# Patient Record
Sex: FEMALE | Race: WHITE | NOT HISPANIC OR LATINO
[De-identification: names, ages, dates, MRNs, and addresses within clinical notes are randomized per-mention and may not be internally consistent; named-entity substitution may affect disease eponyms.]

---

## 2019-06-17 ENCOUNTER — RESULT REVIEW (OUTPATIENT)
Age: 54
End: 2019-06-17

## 2021-04-16 PROBLEM — Z00.00 ENCOUNTER FOR PREVENTIVE HEALTH EXAMINATION: Status: ACTIVE | Noted: 2021-04-16

## 2021-05-18 ENCOUNTER — APPOINTMENT (OUTPATIENT)
Dept: OBGYN | Facility: CLINIC | Age: 56
End: 2021-05-18
Payer: COMMERCIAL

## 2021-05-18 VITALS
SYSTOLIC BLOOD PRESSURE: 112 MMHG | TEMPERATURE: 97.4 F | RESPIRATION RATE: 16 BRPM | HEIGHT: 66 IN | DIASTOLIC BLOOD PRESSURE: 68 MMHG | BODY MASS INDEX: 37.28 KG/M2 | WEIGHT: 232 LBS

## 2021-05-18 DIAGNOSIS — Z78.9 OTHER SPECIFIED HEALTH STATUS: ICD-10-CM

## 2021-05-18 DIAGNOSIS — Z83.3 FAMILY HISTORY OF DIABETES MELLITUS: ICD-10-CM

## 2021-05-18 DIAGNOSIS — Z82.49 FAMILY HISTORY OF ISCHEMIC HEART DISEASE AND OTHER DISEASES OF THE CIRCULATORY SYSTEM: ICD-10-CM

## 2021-05-18 DIAGNOSIS — Z86.79 PERSONAL HISTORY OF OTHER DISEASES OF THE CIRCULATORY SYSTEM: ICD-10-CM

## 2021-05-18 PROCEDURE — 99072 ADDL SUPL MATRL&STAF TM PHE: CPT

## 2021-05-18 PROCEDURE — 99386 PREV VISIT NEW AGE 40-64: CPT

## 2021-05-18 RX ORDER — MV-MIN/FOLIC/VIT K/LYCOP/COQ10 200-100MCG
CAPSULE ORAL
Refills: 0 | Status: ACTIVE | COMMUNITY

## 2021-05-18 RX ORDER — ROSUVASTATIN CALCIUM 20 MG/1
20 TABLET, FILM COATED ORAL
Refills: 0 | Status: ACTIVE | COMMUNITY

## 2021-05-18 NOTE — LETTER GREETING
[Dear  ___] : Dear  [unfilled], [FreeTextEntry1] : I had the pleasure of evaluating your patient, KAYLEY WHITNEY . Please see my summary of recommendations followed by my full note. \par \par Thank you for allowing me to participate in the care of this patient. If you have any questions, please do not hesitate to contact me.\par \par Sincerely,\par \par Emily Mccullough MD, FACOG \par [facility]\par Obstetrics and Gynecology in [place]\[address], [facility] [address]\[place] [address]\[facility] Phone: [phone] Fax: [phone]

## 2021-05-18 NOTE — DISCUSSION/SUMMARY
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 \par 1) Health maintenance: \par Vaginal pap today given unclear hx\par Up to date on mammogram and colonoscopy\par \par 2) Hx of atypical cells:\par Records requested for prior paps and op report/pathology report\par If no evidence of cervical dysplasia, can discontinue screening pap smears\par \par Return in 1 year or prn

## 2021-05-18 NOTE — PHYSICAL EXAM
[Appropriately responsive] : appropriately responsive [Alert] : alert [No Acute Distress] : no acute distress [No Lymphadenopathy] : no lymphadenopathy [Soft] : soft [Non-tender] : non-tender [Non-distended] : non-distended [No HSM] : No HSM [No Lesions] : no lesions [No Mass] : no mass [Oriented x3] : oriented x3 [Examination Of The Breasts] : a normal appearance [No Discharge] : no discharge [No Masses] : no breast masses were palpable [Labia Majora] : normal [Labia Minora] : normal [Normal] : normal [Absent] : absent [Uterine Adnexae] : normal [FreeTextEntry4] : vaginal cuff intact, no lesions [FreeTextEntry8] : vaginal cuff intact

## 2021-05-18 NOTE — HISTORY OF PRESENT ILLNESS
[Patient reported mammogram was normal] : Patient reported mammogram was normal [Patient reported breast sonogram was normal] : Patient reported breast sonogram was normal [Patient reported PAP Smear was normal] : Patient reported PAP Smear was normal [Patient reported bone density results were normal] : Patient reported bone density results were normal [Patient reported colonoscopy was normal] : Patient reported colonoscopy was normal [postmenopausal] : postmenopausal [N] : Patient is not sexually active [Y] : Positive pregnancy history [Mammogramdate] : 2021 [BreastSonogramDate] : 2021 [PapSmeardate] : 2019 [BoneDensityDate] : 2018 [TextBox_43] : repeat 10 yrs [ColonoscopyDate] : 2013 [LMPDate] : 2017 [PGHxTotal] : 2 [[facility] : 2 [[facility]] : 2 [FreeTextEntry1] : NSVDx1, C/S x 1. Denies cysts, fibroids, abnormal pap, STI.

## 2021-05-20 LAB — HPV HIGH+LOW RISK DNA PNL CVX: NOT DETECTED

## 2021-05-24 LAB — CYTOLOGY CVX/VAG DOC THIN PREP: NORMAL

## 2021-06-07 ENCOUNTER — NON-APPOINTMENT (OUTPATIENT)
Age: 56
End: 2021-06-07

## 2021-06-24 ENCOUNTER — APPOINTMENT (OUTPATIENT)
Dept: OBGYN | Facility: CLINIC | Age: 56
End: 2021-06-24
Payer: COMMERCIAL

## 2021-06-24 VITALS
HEIGHT: 66 IN | WEIGHT: 231 LBS | BODY MASS INDEX: 37.12 KG/M2 | TEMPERATURE: 97.2 F | DIASTOLIC BLOOD PRESSURE: 66 MMHG | RESPIRATION RATE: 16 BRPM | SYSTOLIC BLOOD PRESSURE: 110 MMHG

## 2021-06-24 LAB
BILIRUB UR QL STRIP: NORMAL
CLARITY UR: CLEAR
COLLECTION METHOD: NORMAL
GLUCOSE UR-MCNC: NORMAL
HCG UR QL: 1 EU/DL
HGB UR QL STRIP.AUTO: NORMAL
KETONES UR-MCNC: NORMAL
LEUKOCYTE ESTERASE UR QL STRIP: NORMAL
NITRITE UR QL STRIP: NORMAL
PH UR STRIP: 7
PROT UR STRIP-MCNC: NORMAL
SP GR UR STRIP: 1.01

## 2021-06-24 PROCEDURE — 99213 OFFICE O/P EST LOW 20 MIN: CPT

## 2021-06-24 PROCEDURE — 81003 URINALYSIS AUTO W/O SCOPE: CPT | Mod: QW

## 2021-06-24 PROCEDURE — 99072 ADDL SUPL MATRL&STAF TM PHE: CPT

## 2021-06-24 NOTE — DISCUSSION/SUMMARY
[FreeTextEntry1] : 55 yo with pelvic pressure now nearly resolved, findings of cystocele. Symptoms likely secondary to constipation.\par -Discussed importance of PO hydration, treating constipation as this can cause pelvic pressure, additional strain on cystocele, and dysfunctional voiding\par -Management of cystocele: pt is largely asymptomatic, we discussed kegel exercises, pessary, surgical correction. She would like to try kegel exercises for now. Handout provided\par -Return in 3 months for follow-up

## 2021-06-24 NOTE — HISTORY OF PRESENT ILLNESS
[FreeTextEntry1] : Pt here for evaluation of pelvic pressure. States has changed her diet recently and has had significant constipation for last 2 weeks. 3 days ago, states she felt pelvic pressure in suprapubic region which worsened over 2 days. Denies dysuria, hematuria, frequency or hesitancy. She did experience 2 episodes of urinary incontinence. Denies vaginal discharge. Pelvic pressure has since improved and nearly resolved. Saw PCP, UA and UCx was negative. \par \par Udip in office negative today.

## 2021-06-24 NOTE — PHYSICAL EXAM
[Appropriately responsive] : appropriately responsive [Alert] : alert [No Acute Distress] : no acute distress [Soft] : soft [Non-tender] : non-tender [Non-distended] : non-distended [No HSM] : No HSM [No Lesions] : no lesions [No Mass] : no mass [Oriented x3] : oriented x3 [FreeTextEntry7] : No CVAT [Labia Majora] : normal [Labia Minora] : normal [Normal] : normal [Absent] : absent [Uterine Adnexae] : normal [FreeTextEntry4] : vaginal cuff intact, no lesions, cystocele grade 2 on valsalva, no rectocele [FreeTextEntry8] : vaginal cuff intact

## 2021-06-26 ENCOUNTER — TRANSCRIPTION ENCOUNTER (OUTPATIENT)
Age: 56
End: 2021-06-26

## 2021-09-27 ENCOUNTER — APPOINTMENT (OUTPATIENT)
Dept: OBGYN | Facility: CLINIC | Age: 56
End: 2021-09-27
Payer: COMMERCIAL

## 2021-09-27 VITALS
HEIGHT: 66 IN | DIASTOLIC BLOOD PRESSURE: 68 MMHG | TEMPERATURE: 96.5 F | RESPIRATION RATE: 16 BRPM | SYSTOLIC BLOOD PRESSURE: 110 MMHG | WEIGHT: 229 LBS | BODY MASS INDEX: 36.8 KG/M2

## 2021-09-27 PROCEDURE — 99213 OFFICE O/P EST LOW 20 MIN: CPT

## 2021-09-27 NOTE — DISCUSSION/SUMMARY
[FreeTextEntry1] : 57 yo here for follow-up of pelvic pressure/cystocele. Pelvic pressure was likely secondary to constipation which has now resolved.\par -Continue kegel exercises\par -Return May 2022 for well woman exam, sooner if symptoms arise

## 2021-09-27 NOTE — HISTORY OF PRESENT ILLNESS
[FreeTextEntry1] : Pt here for follow-up. Had back injury beginning of September, just had MRI last week, finding out results today. Pt states since treating constipation she has had no further issues with pelvic pressures. Denies bulge symptoms, denies incontinence.

## 2022-04-13 PROBLEM — Z00.00 ENCOUNTER FOR PREVENTIVE HEALTH EXAMINATION: Noted: 2022-04-13

## 2022-04-18 ENCOUNTER — APPOINTMENT (OUTPATIENT)
Dept: PAIN MANAGEMENT | Facility: CLINIC | Age: 57
End: 2022-04-18
Payer: COMMERCIAL

## 2022-04-18 VITALS — BODY MASS INDEX: 36.96 KG/M2 | HEIGHT: 66 IN | WEIGHT: 230 LBS

## 2022-04-18 DIAGNOSIS — M47.816 SPONDYLOSIS W/OUT MYELOPATHY OR RADICULOPATHY, LUMBAR REGION: ICD-10-CM

## 2022-04-18 PROCEDURE — 99203 OFFICE O/P NEW LOW 30 MIN: CPT

## 2022-04-18 NOTE — ASSESSMENT
[FreeTextEntry1] : A discussion regarding available pain management treatment options occurred with the patient.  These included interventional, rehabilitative, pharmacological, and alternative modalities. We will proceed with the following: \par \par Interventional treatment options:\par - Will consider further pending of MRI imaging\par - May consider lumbar facet directed intervention for ongoing axial low back pain  \par - see additional instructions below \par \par Rehabilitative options: \par - patient failed prior PT trials  \par - participation in active HEP was discussed \par \par Medication based treatment options: \par - initiate trial of MDP \par - poor candidate for oral NSAIDs secondary to prior gastric bypass \par - encouraged usage of OTC analgesics \par - see additional instructions below \par \par Complementary treatment options: \par - Weight management and lifestyle modifications discussed \par - See additional instructions below \par \par Additional treatment recommendations as follows: \par - obtain MRI cervical spine \par - advised f/u with rheumatology \par - follow up after imaging studies for further recommendations\par \par The documentation recorded by the scribe, in my presence, accurately reflects the service I personally performed and the decisions made by me with my edits as appropriate.\par \par I, Alysia Rosenbaum acting as scribe, attest that this documentation has been prepared under the direction and in the presence of Provider Soren Cross DO.

## 2022-04-18 NOTE — HISTORY OF PRESENT ILLNESS
[FreeTextEntry1] : The patient presents for initial evaluation regarding their lumbar and cervical pain. Patient was referred by Dr. Sanchez.  Patient c/o pain to the right buttock pain as well as neck radiating to the right upper extremity. She reports difficulty with sleep. Patient reports she had a near slip and fall in September 2021 which caused onset of symptoms. She sought assistance immediately at [place]. Patient reports progression of pain x 2 weeks. Patient denies weakness, B/B dysfunction or paraesthesia to the bilateral lower extremities. Patient reports no observable benefit with PT. Patient reports she has RA, not controlled with rx. \par  \par Subjective weakness: No \par Lower extremity paresthesias: No \par Bladder/bowel dysfunction: No \par \par Medications: No \par  \par Injections: No \par  \par Pertinent Surgical History: N/A \par  \par Imaging: \par MRI Lumbar Spine (09/23/21) -  Stand Up MRI\par \par At the L1-2 and L2-3 levels, there is facet arthropathy.\par At the L3-4 level, there is a circumferential disc bulge which, posteriorly, impresses upon the thecal sac, extends into and narrows the anteroinferior aspects of both L3-4 neural foramen. There is bilateral facet arthropathy.\par At the L4-5 level, there is a circumferential disc bulge which, posteriorly, impresses upon the thecal sac. There is bilateral facet arthropathy.\par At the L5-S1 level, there is a posterior disc herniation approaching the ventral cord surface. There is anterior disc bulging. There is bilateral facet arthropathy.\par \par Physician Disclaimer: I have personally reviewed and confirmed all HPI data with the patient.

## 2022-04-18 NOTE — PHYSICAL EXAM
[Normal Coordination] : normal coordination [Normal Mood and Affect] : normal mood and affect [Orientated] : orientated [Able to Communicate] : able to communicate [Well Developed] : well developed [Well Nourished] : well nourished [Rotation to right] : rotation to right [5___] : right finger abductors 5[unfilled]/5 [4___] : right grasp 4[unfilled]/5 [Extension] : extension [] : sensory exam non-focal throughout both lower extremities

## 2022-05-19 ENCOUNTER — APPOINTMENT (OUTPATIENT)
Dept: OBGYN | Facility: CLINIC | Age: 57
End: 2022-05-19

## 2022-05-23 ENCOUNTER — APPOINTMENT (OUTPATIENT)
Dept: PAIN MANAGEMENT | Facility: CLINIC | Age: 57
End: 2022-05-23
Payer: COMMERCIAL

## 2022-05-23 VITALS — HEIGHT: 66 IN | WEIGHT: 230 LBS | BODY MASS INDEX: 36.96 KG/M2

## 2022-05-23 DIAGNOSIS — M79.18 MYALGIA, OTHER SITE: ICD-10-CM

## 2022-05-23 PROCEDURE — 99214 OFFICE O/P EST MOD 30 MIN: CPT

## 2022-05-24 NOTE — HISTORY OF PRESENT ILLNESS
[Neck] : neck [Sudden] : sudden [2] : 2 [Dull/Aching] : dull/aching [Localized] : localized [Intermittent] : intermittent [Leisure] : leisure [Social interactions] : social interactions [Meds] : meds [Full time] : Work status: full time [FreeTextEntry1] : 5/23/22 - Patient presents for an MRI imaging review. Patient reports improvement of symptoms since the previous visit. She reports benefit with MDP. Patient reports residual pain to the neck radiating to the top of the right shoulder. She reports resolution of RUE pain. \par \par 4/18/22 - The patient presents for initial evaluation regarding their lumbar and cervical pain. Patient was referred by Dr. Sanchez.  Patient c/o pain to the right buttock pain as well as neck radiating to the right upper extremity. She reports difficulty with sleep. Patient reports she had a near slip and fall in September 2021 which caused onset of symptoms. She sought assistance immediately at [place]. Patient reports progression of pain x 2 weeks. Patient denies weakness, B/B dysfunction or paraesthesia to the bilateral lower extremities. Patient reports no observable benefit with PT. Patient reports she has RA, not controlled with rx.\par  \par Injections: No\par  \par Pertinent Surgical History: N/A \par  \par Imaging: \par 1) MRI Cervical Spine (04/22/22) - [facility]\par \par C2-C3: No disc protrusion. There is no neural foraminal narrowing. There is no central canal stenosis. There is no facet joint arthrosis.\par C3-C4: No disc protrusion. There is bilateral neural foraminal narrowing. There is no central canal stenosis. There is no facet joint arthrosis.\par C4-C5: There is a minimal broad-based posterior disc osteophyte complex. There is bilateral neural foraminal narrowing. There is no central canal stenosis. There is no facet joint arthrosis.\par C5-C6: There is a 3 mm broad-based posterior disc osteophyte complex. There is bilateral neural foraminal narrowing. There is spinal canal stenosis measuring less than 9 mm in AP dimension. There is bilateral facet joint arthrosis.\par C6-C7: There is a 3 to 4 mm broad-based posterior disc osteophyte complex. There is bilateral neural foraminal narrowing. There is spinal canal stenosis measuring 9 mm in AP dimension. There is no facet joint arthrosis.\par C7-T1: No disc protrusion. There is no neural foraminal narrowing. There is no central canal stenosis. There is no facet joint arthrosis.\par \par 2) MRI Lumbar Spine (09/23/21) -  Stand Up MRI\par \par At the L1-2 and L2-3 levels, there is facet arthropathy.\par At the L3-4 level, there is a circumferential disc bulge which, posteriorly, impresses upon the thecal sac, extends into and narrows the anteroinferior aspects of both L3-4 neural foramen. There is bilateral facet arthropathy.\par At the L4-5 level, there is a circumferential disc bulge which, posteriorly, impresses upon the thecal sac. There is bilateral facet arthropathy.\par At the L5-S1 level, there is a posterior disc herniation approaching the ventral cord surface. There is anterior disc bulging. There is bilateral facet arthropathy.\par \par Physician Disclaimer: I have personally reviewed and confirmed all HPI data with the patient. [] : Post Surgical Visit: no [FreeTextEntry7] : right shoulder  [de-identified] : cervical mri at [facility] [de-identified] : [facility]

## 2022-05-24 NOTE — PHYSICAL EXAM
[Normal Coordination] : normal coordination [Normal Mood and Affect] : normal mood and affect [Orientated] : orientated [Able to Communicate] : able to communicate [Well Developed] : well developed [Well Nourished] : well nourished [Rotation to right] : rotation to right [Extension] : extension [] : negative Childress maneuver facet loading

## 2022-05-24 NOTE — ASSESSMENT
[FreeTextEntry1] : A discussion regarding available pain management treatment options occurred with the patient.  These included interventional, rehabilitative, pharmacological, and alternative modalities. We will proceed with the following: \par \par Interventional treatment options:\par - would proceed with right PM C7-T1 SUMA with return of neck / radicular pain - will call \par - May consider lumbar facet directed intervention for ongoing axial low back pain  \par - see additional instructions below \par \par Rehabilitative options: \par - patient failed prior PT trials  \par - participation in active HEP was discussed \par \par Medication based treatment options: \par - completed trial of MDP with good relief  \par - poor candidate for oral NSAIDs secondary to prior gastric bypass \par - encouraged usage of OTC analgesics \par - patient declines trial of muscle relaxants \par \par Complementary treatment options: \par - Weight management and lifestyle modifications discussed\par \par Additional treatment recommendations as follows: \par - advised f/u with rheumatology \par - f/u for indicated procedure or in 6 weeks\par - orthopedic spine evaluation discussed in the event of worsening symptoms\par - Follow up 1-2 weeks post injection for assessment of efficacy and further recommendations.\par \par The risks, benefits and alternatives of the proposed procedure were explained in detail with the patient. The risks outlined include but are not limited to infection, bleeding, post- dural puncture headache, nerve injury, a temporary increase in pain, failure to resolve symptoms, allergic reaction, and possible elevation of blood sugar in diabetics if using corticosteroid.  All questions were answered to patient's apparent satisfaction and he/she verbalized an understanding.\par \par The documentation recorded by the scribe, in my presence, accurately reflects the service I personally performed and the decisions made by me with my edits as appropriate.\par \par I, Alysia Rosenbaum acting as scribe, attest that this documentation has been prepared under the direction and in the presence of Provider Soren Cross DO.

## 2022-06-30 ENCOUNTER — APPOINTMENT (OUTPATIENT)
Dept: OBGYN | Facility: CLINIC | Age: 57
End: 2022-06-30

## 2022-06-30 VITALS
BODY MASS INDEX: 36.96 KG/M2 | WEIGHT: 230 LBS | HEIGHT: 66 IN | SYSTOLIC BLOOD PRESSURE: 110 MMHG | RESPIRATION RATE: 16 BRPM | DIASTOLIC BLOOD PRESSURE: 70 MMHG

## 2022-06-30 DIAGNOSIS — N81.11 CYSTOCELE, MIDLINE: ICD-10-CM

## 2022-06-30 PROCEDURE — 99396 PREV VISIT EST AGE 40-64: CPT

## 2022-06-30 NOTE — HISTORY OF PRESENT ILLNESS
[Patient reported mammogram was normal] : Patient reported mammogram was normal [Patient reported breast sonogram was normal] : Patient reported breast sonogram was normal [Patient reported PAP Smear was normal] : Patient reported PAP Smear was normal [Patient reported bone density results were normal] : Patient reported bone density results were normal [Patient reported colonoscopy was normal] : Patient reported colonoscopy was normal [postmenopausal] : postmenopausal [N] : Patient is not sexually active [Y] : Positive pregnancy history [TextBox_4] : 57 yo here for well woman exam. 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 c/o bump in groin 1 week ago, right side, not painful. Denies postmenopausal symptoms. No bleeding. No symptoms from cystocele.  [Mammogramdate] : 2022 [BreastSonogramDate] : 2022 [PapSmeardate] : 2021 [TextBox_31] : HPV neg, discontinue given hysterectomy for benign indications [BoneDensityDate] : 2018 [ColonoscopyDate] : 2013 [TextBox_43] : repeat 10 yrs [LMPDate] : 2017 [PGHxTotal] : 2 [[facility]] : 2 [[facility]] : 2 [FreeTextEntry1] : NSVDx1, C/S x 1. Denies cysts, fibroids, abnormal pap, STI.

## 2022-06-30 NOTE — PHYSICAL EXAM
[Chaperone Present] : A chaperone was present in the examining room during all aspects of the physical examination [FreeTextEntry1] : ALEXIS Newman  [Appropriately responsive] : appropriately responsive [Alert] : alert [No Acute Distress] : no acute distress [Soft] : soft [Non-tender] : non-tender [Non-distended] : non-distended [No HSM] : No HSM [No Lesions] : no lesions [No Mass] : no mass [Oriented x3] : oriented x3 [Examination Of The Breasts] : a normal appearance [No Discharge] : no discharge [No Masses] : no breast masses were palpable [Labia Majora] : normal [Labia Minora] : normal [Normal] : normal [Cystocele] : a cystocele [Absent] : absent [Uterine Adnexae] : normal [FreeTextEntry8] : vaginal cuff intact

## 2022-06-30 NOTE — DISCUSSION/SUMMARY
[FreeTextEntry1] : 55 yo here for well woman exam, s/p vaginal hysterectomy/BSO 2017 for complex atypical hyerplasia.\par 1) Health maintenance:\par Discontinue pap given hysterectomy for benign indications\par Up to date on mammogram, colonoscopy, DEXA\par \par 2) Cystocele: \par Currently asymptomatic\par Kegel exercises\par \par Return in 1 year or prn.

## 2022-07-12 ENCOUNTER — APPOINTMENT (OUTPATIENT)
Dept: PAIN MANAGEMENT | Facility: CLINIC | Age: 57
End: 2022-07-12

## 2022-07-12 VITALS — HEIGHT: 66 IN | WEIGHT: 230 LBS | BODY MASS INDEX: 36.96 KG/M2

## 2022-07-12 DIAGNOSIS — M50.30 OTHER CERVICAL DISC DEGENERATION, UNSPECIFIED CERVICAL REGION: ICD-10-CM

## 2022-07-12 DIAGNOSIS — M54.12 RADICULOPATHY, CERVICAL REGION: ICD-10-CM

## 2022-07-12 DIAGNOSIS — M48.02 SPINAL STENOSIS, CERVICAL REGION: ICD-10-CM

## 2022-07-12 PROCEDURE — 99213 OFFICE O/P EST LOW 20 MIN: CPT

## 2022-07-13 NOTE — HISTORY OF PRESENT ILLNESS
[Neck] : neck [4] : 4 [Dull/Aching] : dull/aching [Localized] : localized [Intermittent] : intermittent [Leisure] : leisure [FreeTextEntry1] : 07/12/22 - Patient presents for a FUV.  Patient reports some mild improvements of all her symptoms. Patient reports that at this point, she continues to experience crepitus with ROM in her neck, and some intermittent discomfort in her neck. Patient reports that she has discontinued medication therapies. Patient has not started massage therapy as she has been felling well. \par \par 5/23/22 - Patient presents for an MRI imaging review. Patient reports improvement of symptoms since the previous visit. She reports benefit with MDP. Patient reports residual pain to the neck radiating to the top of the right shoulder. She reports resolution of RUE pain. \par \par 4/18/22 - The patient presents for initial evaluation regarding their lumbar and cervical pain. Patient was referred by Dr. Sanchez. Patient c/o pain to the right buttock pain as well as neck radiating to the right upper extremity. She reports difficulty with sleep. Patient reports she had a near slip and fall in September 2021 which caused onset of symptoms. She sought assistance immediately at [place]. Patient reports progression of pain x 2 weeks. Patient denies weakness, B/B dysfunction or paraesthesia to the bilateral lower extremities. Patient reports no observable benefit with PT. Patient reports she has RA, not controlled with rx.\par  \par Injections: No\par  \par Pertinent Surgical History: N/A \par  \par Imaging: \par 1) MRI Cervical Spine (04/22/22) - [facility]\par \par C2-C3: No disc protrusion. There is no neural foraminal narrowing. There is no central canal stenosis. There is no facet joint arthrosis.\par C3-C4: No disc protrusion. There is bilateral neural foraminal narrowing. There is no central canal stenosis. There is no facet joint arthrosis.\par C4-C5: There is a minimal broad-based posterior disc osteophyte complex. There is bilateral neural foraminal narrowing. There is no central canal stenosis. There is no facet joint arthrosis.\par C5-C6: There is a 3 mm broad-based posterior disc osteophyte complex. There is bilateral neural foraminal narrowing. There is spinal canal stenosis measuring less than 9 mm in AP dimension. There is bilateral facet joint arthrosis.\par C6-C7: There is a 3 to 4 mm broad-based posterior disc osteophyte complex. There is bilateral neural foraminal narrowing. There is spinal canal stenosis measuring 9 mm in AP dimension. There is no facet joint arthrosis.\par C7-T1: No disc protrusion. There is no neural foraminal narrowing. There is no central canal stenosis. There is no facet joint arthrosis.\par \par 2) MRI Lumbar Spine (09/23/21) - Stand Up MRI\par \par At the L1-2 and L2-3 levels, there is facet arthropathy.\par At the L3-4 level, there is a circumferential disc bulge which, posteriorly, impresses upon the thecal sac, extends into and narrows the anteroinferior aspects of both L3-4 neural foramen. There is bilateral facet arthropathy.\par At the L4-5 level, there is a circumferential disc bulge which, posteriorly, impresses upon the thecal sac. There is bilateral facet arthropathy.\par At the L5-S1 level, there is a posterior disc herniation approaching the ventral cord surface. There is anterior disc bulging. There is bilateral facet arthropathy.\par \par Physician Disclaimer: I have personally reviewed and confirmed all HPI data with the patient.  [] : Post Surgical Visit: no

## 2022-07-13 NOTE — ASSESSMENT
[FreeTextEntry1] : A discussion regarding available pain management treatment options occurred with the patient. These included interventional, rehabilitative, pharmacological, and alternative modalities. We will proceed with the following: \par \par Interventional treatment options:\par - None indicated at this time\par - May consider right PM C7-T1 SUMA with return of neck / radicular pain\par - see additional instructions below \par \par Rehabilitative options: \par - patient failed prior PT trials \par - participation in active HEP was discussed \par \par Medication based treatment options: \par - poor candidate for oral NSAIDs secondary to prior gastric bypass \par - encouraged usage of OTC analgesics \par - patient declines trial of muscle relaxants \par \par Complementary treatment options: \par - Weight management and lifestyle modifications discussed\par \par Additional treatment recommendations as follows: \par - advised f/u with rheumatology \par - Patient will follow up on an as needed basis \par \par I, Shayan Calvo acting as scribe, attest that this documentation has been prepared under the direction and in the presence of Provider Soren Cross DO. \par \par The documentation recorded by the scribe, in my presence, accurately reflects the service I personally performed and the decisions made by me with my edits as appropriate.

## 2022-07-13 NOTE — PHYSICAL EXAM
[de-identified] : Constitutional:  \par - No acute distress  \par - Well developed; well nourished  \par \par Neurological:  \par - normal mood and affect  \par - alert and oriented x 3   \par \par Cardiovascular:  \par - grossly normal \par \par Inspection of the Cervical Spine is as Follows:\par - NO Scars\par - No Masses\par - No Erythema\par - No Ecchymosis \par \par Upper Extremity Strength Testing is as follows:\par - Left Deltoid 5/5\par - Left Biceps 5/5\par - Left Triceps 5/5\par - Left Wrist Flexors 5/5\par - Left finger abductors 5/5\par - Left grasp 5/5\par - Right Deltoid 5/5\par - Right Biceps 5/5\par - Right Triceps 5/5\par - Right Wrist Flexors 5/5\par - Right finger abductors 5/5\par - Right grasp 5/5 \par \par Palpation of the cervical spine is as follows:\par - No trapezial Spasm\par - No Rhomboid Spasm\par - No paracervical Spasm\par - No trapezial Tenderness\par - No Rhomboid Tenderness\par - No paracervical tenderness \par \par Neurological Testing for the cervical spine is as follows:\par - Light Touch is intact throughout both upper extremities\par - Normal deep tendon reflexes bilateral upper extremities\par - Negative Spurling Test\par - Negative Sage Reflex\par - Deep Tendon Reflexes LEFT: Biceps (2+) | Triceps (2+) | Brachioradialis (2+)\par - Deep Tendon Reflexes RIGHT: Biceps (2+) | Triceps (2+) | Brachioradialis (2+)

## 2023-08-10 ENCOUNTER — APPOINTMENT (OUTPATIENT)
Dept: OBGYN | Facility: CLINIC | Age: 58
End: 2023-08-10
Payer: COMMERCIAL

## 2023-08-10 VITALS
DIASTOLIC BLOOD PRESSURE: 82 MMHG | HEIGHT: 66 IN | BODY MASS INDEX: 36.96 KG/M2 | SYSTOLIC BLOOD PRESSURE: 124 MMHG | WEIGHT: 230 LBS

## 2023-08-10 DIAGNOSIS — Z01.419 ENCOUNTER FOR GYNECOLOGICAL EXAMINATION (GENERAL) (ROUTINE) W/OUT ABNORMAL FINDINGS: ICD-10-CM

## 2023-08-10 DIAGNOSIS — R10.2 PELVIC AND PERINEAL PAIN: ICD-10-CM

## 2023-08-10 PROCEDURE — 99396 PREV VISIT EST AGE 40-64: CPT

## 2023-08-10 PROCEDURE — 81003 URINALYSIS AUTO W/O SCOPE: CPT | Mod: QW

## 2023-08-10 RX ORDER — CHOLECALCIFEROL (VITAMIN D3) 25 MCG
TABLET ORAL
Refills: 0 | Status: ACTIVE | COMMUNITY

## 2023-08-10 RX ORDER — THIAMINE HCL 100 MG
TABLET ORAL
Refills: 0 | Status: ACTIVE | COMMUNITY

## 2023-08-10 RX ORDER — FLUOXETINE 20 MG/1
TABLET ORAL
Refills: 0 | Status: ACTIVE | COMMUNITY

## 2023-08-10 RX ORDER — METHYLPREDNISOLONE 4 MG/1
4 TABLET ORAL
Qty: 1 | Refills: 0 | Status: DISCONTINUED | COMMUNITY
Start: 2022-04-18 | End: 2023-08-10

## 2023-08-10 NOTE — HISTORY OF PRESENT ILLNESS
[Patient reported mammogram was normal] : Patient reported mammogram was normal [Patient reported breast sonogram was normal] : Patient reported breast sonogram was normal [Patient reported PAP Smear was normal] : Patient reported PAP Smear was normal [Patient reported bone density results were normal] : Patient reported bone density results were normal [Patient reported colonoscopy was normal] : Patient reported colonoscopy was normal [postmenopausal] : postmenopausal [N] : Patient is not sexually active [Y] : Positive pregnancy history [TextBox_4] : 57 yo here for well woman exam. Hx of vaginal hysterectomy/BSO at [place] 2017 for complex atypical hyperplasia.  Denies postmenopausal symptoms. No bleeding. No symptoms from cystocele. Occasional pelvic pressure, denies urinary symptoms.  [Mammogramdate] : 2023 [BreastSonogramDate] : 2023 [PapSmeardate] : 2021 [TextBox_31] : HPV neg, discontinue given hysterectomy for benign indications [BoneDensityDate] : 2018 [ColonoscopyDate] : 2023 [TextBox_43] : benign poyps removed [LMPDate] : 2017 [PGHxTotal] : 2 [[facility] [[facility]] : 2 [FreeTextEntry1] : NSVDx1, C/S x 1. Denies cysts, fibroids, abnormal pap, STI.

## 2023-08-10 NOTE — DISCUSSION/SUMMARY
[FreeTextEntry1] : 55 yo here for well woman exam, s/p vaginal hysterectomy/BSO 2017 for complex atypical hyerplasia. 1) Health maintenance: Discontinue pap given hysterectomy for benign indications Up to date on mammogram, colonoscopy, DEXA  2) Cystocele:  Currently asymptomatic Kegel exercises  3) Pelvic pressure: Udip negative Reassurance given  Return in 1 year or prn.

## 2023-08-10 NOTE — PHYSICAL EXAM
[Chaperone Present] : A chaperone was present in the examining room during all aspects of the physical examination [FreeTextEntry1] : ALEXIS Quinteros  [Appropriately responsive] : appropriately responsive [Alert] : alert [No Acute Distress] : no acute distress [Soft] : soft [Non-tender] : non-tender [Non-distended] : non-distended [No HSM] : No HSM [No Lesions] : no lesions [No Mass] : no mass [Oriented x3] : oriented x3 [Examination Of The Breasts] : a normal appearance [No Discharge] : no discharge [No Masses] : no breast masses were palpable [Labia Majora] : normal [Labia Minora] : normal [Normal] : normal [Cystocele] : a cystocele [Absent] : absent [Uterine Adnexae] : normal [FreeTextEntry8] : vaginal cuff intact

## 2024-03-19 ENCOUNTER — APPOINTMENT (OUTPATIENT)
Dept: ORTHOPEDIC SURGERY | Facility: CLINIC | Age: 59
End: 2024-03-19
Payer: COMMERCIAL

## 2024-03-19 VITALS — HEIGHT: 66 IN | BODY MASS INDEX: 38.57 KG/M2 | WEIGHT: 240 LBS

## 2024-03-19 DIAGNOSIS — S80.02XA CONTUSION OF LEFT KNEE, INITIAL ENCOUNTER: ICD-10-CM

## 2024-03-19 DIAGNOSIS — M06.9 RHEUMATOID ARTHRITIS, UNSPECIFIED: ICD-10-CM

## 2024-03-19 DIAGNOSIS — M17.12 UNILATERAL PRIMARY OSTEOARTHRITIS, LEFT KNEE: ICD-10-CM

## 2024-03-19 PROCEDURE — 99213 OFFICE O/P EST LOW 20 MIN: CPT

## 2024-03-19 PROCEDURE — 99203 OFFICE O/P NEW LOW 30 MIN: CPT

## 2024-03-19 RX ORDER — LEFLUNOMIDE 10 MG/1
10 TABLET, FILM COATED ORAL
Refills: 0 | Status: ACTIVE | COMMUNITY

## 2024-03-19 NOTE — PHYSICAL EXAM
[NL (0)] : extension 0 degrees [5___] : hamstring 5[unfilled]/5 [] : patient ambulates without assistive device [Left] : left knee [AP] : anteroposterior [Lateral] : lateral [de-identified] : patella tracks laterally  [FreeTextEntry9] : joint space narrowing lateral osteophytes in all 3 compartments  [TWNoteComboBox7] : flexion 120 degrees

## 2024-03-19 NOTE — HISTORY OF PRESENT ILLNESS
[de-identified] : Date of Injury/Onset:     1/31/24  AFTER FALL .  HAS INTERMITTENT PAIN hAS HAD PAIN IN KNEE PRIOR TO FALL RA AFFECTING FULL BODY PAIN AND STIFFNESS NO PRIOR TREATMETN FOR KNEE PAIN Pain: At Rest:  3/10   With Activity: 6/10-on bad day Affecting Sleep: Y Difficulty with stairs: Y Difficulty getting in and out of car:Y Sit to stand stiffness: Y Affects walking short/long distances? N Home/Work/Recreation effected?   Mechanism of injury:  patient fell out of bed This  is not a Work-Related Injury being treated under Worker's Compensation. This is not   an athletic injury occurring associated with an interscholastic or organized sports team. Quality of symptoms:C/O ANTERIOR AND POSTERIOR PAIN, POSTERIOR SWELLING, CLICKING Improves with:   MOVEMENT Worse with:   REST Have you been treated for this in the past?Y Have you had surgery for this in the past?N OTC Medicines: NO RX medicines: meloxicam Heat, Ice, Elevation:  N CSI or Gel Injections:  (Indicate which) N Physical Therapy/ HEP:  N Previous Treatment/Imaging/Studies Since Last Visit: BIOFREEZE. VOLTAREN GEL Reports Available for Review Today: Z/P XR

## 2024-03-19 NOTE — DISCUSSION/SUMMARY
[de-identified] : Following discussion of the options the plan at this time is for the patient to go forward with organized therapy. Patient was provided with a Rx for PT at this time. Patient expressed understanding the treatment plan and will begin PT as soon as they can.   pt has meds from rheumatologist  f/u PRN